# Patient Record
Sex: FEMALE | Race: BLACK OR AFRICAN AMERICAN | NOT HISPANIC OR LATINO | URBAN - METROPOLITAN AREA
[De-identification: names, ages, dates, MRNs, and addresses within clinical notes are randomized per-mention and may not be internally consistent; named-entity substitution may affect disease eponyms.]

---
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
## 2023-06-12 ENCOUNTER — APPOINTMENT (OUTPATIENT)
Dept: RHEUMATOLOGY | Facility: CLINIC | Age: 23
End: 2023-06-12
Payer: MEDICAID

## 2023-06-12 DIAGNOSIS — I73.00 RAYNAUD'S SYNDROME W/OUT GANGRENE: ICD-10-CM

## 2023-06-12 PROCEDURE — 99204 OFFICE O/P NEW MOD 45 MIN: CPT

## 2023-06-12 RX ORDER — NIFEDIPINE 30 MG/1
30 TABLET, EXTENDED RELEASE ORAL DAILY
Qty: 30 | Refills: 2 | Status: ACTIVE | COMMUNITY
Start: 2023-06-12 | End: 1900-01-01

## 2023-06-15 NOTE — REVIEW OF SYSTEMS
[Feeling Tired] : feeling tired [SOB on Exertion] : shortness of breath during exertion [As Noted in HPI] : as noted in HPI [Arthralgias] : arthralgias [Joint Pain] : joint pain [Joint Stiffness] : joint stiffness [Negative] : Heme/Lymph [Fever] : no fever [Chills] : no chills [Feeling Poorly] : not feeling poorly [Recent Weight Gain (___ Lbs)] : no recent weight gain [Recent Weight Loss (___ Lbs)] : no recent weight loss [Joint Swelling] : no joint swelling [Limb Pain] : no limb pain [Limb Swelling] : no limb swelling

## 2023-06-15 NOTE — HISTORY OF PRESENT ILLNESS
[Currently Experiencing] : currently [Fatigue] : fatigue [Dysphagia] : dysphagia [Shortness of Breath] : shortness of breath [Arthralgias] : arthralgias [Decreased ROM] : decreased range of motion [Morning Stiffness] : morning stiffness [Dyspnea] : dyspnea [FreeTextEntry1] : 22 year old female here to establish care for MCTD\par \par She was previously seen by peds rheum at [facility]. Has been loss to follow up since 2018.\par Has not been doing well lately. Feels that her joints get stiff and stuck, particularly in the legs (ankles, knees). Does not notice any swelling in the joints. No weakness in the muscles. Occasional dyspnea with exertion. Has not had any h/o pericardial effusion or pleural effusion. Recently saw a cardiologist for possible heart murmur - had TTE and EKG appr. 1 - 2 months ago. GERD + sometimes feels that the food gets stuck in the chest. \par \par Raynaud's + finger tips turn blue and get numb. No h/o digital ulcers. No blood clots. No h/o pregnancy  \par \par \par Prior HPI per Dr. 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 Started Prednisone \par ---10/2012 Seen by cardiology for chest pain. Echo WNL. No pulmonary HTN. Chest CT WNL.\par ---7/1/2013 off prednisone\par ---Moderate restrictive lung function on PFTs- DLCO 50% but stable. Last Pulmonology visit was on 10/1/14, recommended yearly follow up, sooner PRN\par ---8/2014 Normal swallow study\par ---1/2016-7/2016: Lost to follow up, continued methotrexate until 5/2016, when refill was denied pending follow up, discontinued HCQ and vitamin D after running out of supply \par ---Not here for past 2 years.. Now c/o of hip pain, occasional chest pain- middle; hot days; worse with inspiration. Feels SOB on hot days. Chest pain resolves when she uses her inhaler. Dysphagia- with steak. Has hip pain on right- started a few days ago. No back pain. Not taking any medications for pain. No joint swelling or stiffness. Had a few days of neck pain from bad sleeping posture; since resolved. No muscle weakness. No myalgias. Has not taken any medications for 2 years (?insurance issues). Reports Raynaud's symptoms. Denies fevers, hair loss, oral ulcers, rash, abdominal pain, N/V/D, hematuria.\par \par  [Anorexia] : no anorexia [Weight Loss] : no weight loss [Malaise] : no malaise [Fever] : no fever [Chills] : no chills [Depression] : no depression [Malar Facial Rash] : no malar facial rash [Skin Lesions] : no lesions [Skin Nodules] : no skin nodules [Oral Ulcers] : no oral ulcers [Cough] : no cough [Dry Mouth] : no dry mouth [Dysphonia] : no dysphonia [Chest Pain] : no chest pain [Joint Swelling] : no joint swelling [Joint Warmth] : no joint warmth [Joint Deformity] : no joint deformity [Falls] : no falls [Difficulty Standing] : no difficulty standing [Difficulty Walking] : no difficulty walking [Myalgias] : no myalgias [Muscle Weakness] : no muscle weakness [Muscle Spasms] : no muscle spasms [Muscle Cramping] : no muscle cramping [Visual Changes] : no visual changes [Eye Pain] : no eye pain [Eye Redness] : no eye redness [Dry Eyes] : no dry eyes

## 2023-06-15 NOTE — PHYSICAL EXAM
[General Appearance - Alert] : alert [General Appearance - In No Acute Distress] : in no acute distress [Sclera] : the sclera and conjunctiva were normal [PERRL With Normal Accommodation] : pupils were equal in size, round, and reactive to light [Extraocular Movements] : extraocular movements were intact [Neck Appearance] : the appearance of the neck was normal [Neck Cervical Mass (___cm)] : no neck mass was observed [Jugular Venous Distention Increased] : there was no jugular-venous distention [Thyroid Diffuse Enlargement] : the thyroid was not enlarged [Thyroid Nodule] : there were no palpable thyroid nodules [Auscultation Breath Sounds / Voice Sounds] : lungs were clear to auscultation bilaterally [Heart Rate And Rhythm] : heart rate was normal and rhythm regular [Heart Sounds] : normal S1 and S2 [Heart Sounds Gallop] : no gallops [Murmurs] : no murmurs [Heart Sounds Pericardial Friction Rub] : no pericardial rub [Full Pulse] : the pedal pulses are present [Edema] : there was no peripheral edema [Cervical Lymph Nodes Enlarged Posterior Bilaterally] : posterior cervical [Cervical Lymph Nodes Enlarged Anterior Bilaterally] : anterior cervical [Supraclavicular Lymph Nodes Enlarged Bilaterally] : supraclavicular [Axillary Lymph Nodes Enlarged Bilaterally] : axillary [No CVA Tenderness] : no ~M costovertebral angle tenderness [No Spinal Tenderness] : no spinal tenderness [Skin Color & Pigmentation] : normal skin color and pigmentation [Skin Turgor] : normal skin turgor [] : no rash [No Focal Deficits] : no focal deficits [Oriented To Time, Place, And Person] : oriented to person, place, and time [Impaired Insight] : insight and judgment were intact [Affect] : the affect was normal [FreeTextEntry1] : mild sclerodactyly of the hands + No synovitis

## 2023-06-15 NOTE — DATA REVIEWED
[FreeTextEntry1] : Prior records reviewed in [facility]  no discrete location documentation necessary

## 2023-06-15 NOTE — HISTORY OF PRESENT ILLNESS
[Currently Experiencing] : currently [Fatigue] : fatigue [Dysphagia] : dysphagia [Shortness of Breath] : shortness of breath [Arthralgias] : arthralgias [Decreased ROM] : decreased range of motion [Morning Stiffness] : morning stiffness [Dyspnea] : dyspnea [FreeTextEntry1] : 22 year old female here to establish care for MCTD\par \par She was previously seen by peds rheum at [facility]. Has been loss to follow up since 2018.\par Has not been doing well lately. Feels that her joints get stiff and stuck, particularly in the legs (ankles, knees). Does not notice any swelling in the joints. No weakness in the muscles. Occasional dyspnea with exertion. Has not had any h/o pericardial effusion or pleural effusion. Recently saw a cardiologist for possible heart murmur - had TTE and EKG appr. 1 - 2 months ago. GERD + sometimes feels that the food gets stuck in the chest. \par \par Raynaud's + finger tips turn blue and get numb. No h/o digital ulcers. No blood clots. No h/o pregnancy  \par \par \par Prior HPI per Dr. 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 Started Prednisone \par ---10/2012 Seen by cardiology for chest pain. Echo WNL. No pulmonary HTN. Chest CT WNL.\par ---7/1/2013 off prednisone\par ---Moderate restrictive lung function on PFTs- DLCO 50% but stable. Last Pulmonology visit was on 10/1/14, recommended yearly follow up, sooner PRN\par ---8/2014 Normal swallow study\par ---1/2016-7/2016: Lost to follow up, continued methotrexate until 5/2016, when refill was denied pending follow up, discontinued HCQ and vitamin D after running out of supply \par ---Not here for past 2 years.. Now c/o of hip pain, occasional chest pain- middle; hot days; worse with inspiration. Feels SOB on hot days. Chest pain resolves when she uses her inhaler. Dysphagia- with steak. Has hip pain on right- started a few days ago. No back pain. Not taking any medications for pain. No joint swelling or stiffness. Had a few days of neck pain from bad sleeping posture; since resolved. No muscle weakness. No myalgias. Has not taken any medications for 2 years (?insurance issues). Reports Raynaud's symptoms. Denies fevers, hair loss, oral ulcers, rash, abdominal pain, N/V/D, hematuria.\par \par  [Anorexia] : no anorexia [Weight Loss] : no weight loss [Malaise] : no malaise [Fever] : no fever [Chills] : no chills [Depression] : no depression [Malar Facial Rash] : no malar facial rash [Skin Lesions] : no lesions [Skin Nodules] : no skin nodules [Oral Ulcers] : no oral ulcers [Cough] : no cough [Dry Mouth] : no dry mouth [Dysphonia] : no dysphonia [Chest Pain] : no chest pain [Joint Swelling] : no joint swelling [Joint Warmth] : no joint warmth [Joint Deformity] : no joint deformity [Falls] : no falls [Difficulty Standing] : no difficulty standing [Difficulty Walking] : no difficulty walking [Myalgias] : no myalgias [Muscle Weakness] : no muscle weakness [Muscle Spasms] : no muscle spasms [Muscle Cramping] : no muscle cramping [Visual Changes] : no visual changes [Eye Pain] : no eye pain [Eye Redness] : no eye redness [Dry Eyes] : no dry eyes

## 2023-06-15 NOTE — ASSESSMENT
[FreeTextEntry1] : 22 year old female with MCTD here to establish care \par \par 1. MCTD with systemic sclerosis, myositis and SLE features - early sclerodactyly, myositis, GERD, arthralgias\par i) Skin: stable skin tightening and early sclerodactyly. Monitor for now. Encouraged to do PT/OT at home consistently. Denies active Raynaud's symptoms and did not have any color changes today during my exam. Will monitor for now. \par ii) MSK: elevated CPK in the past. Repeat labs today.\par iii) Pulmonary:Repeat PFTs and CT chest due. \par iv) Cardiac: ECHO was performed in May 2023 and was relatively normal. Repeat in 1 year . \par v) GI:  has had some difficulty swallowing and with GERD. Stable at this time. Monitor. \par vi) Renal: No h/o renal involvement \par vii) Heme: labs today\par viii) Neuro: no active issues\par \par Follow up in 6 weeks to discuss therapy after reviewing labs

## 2023-06-30 LAB
ALBUMIN SERPL ELPH-MCNC: 4.8 G/DL
ALP BLD-CCNC: 61 U/L
ALT SERPL-CCNC: 20 U/L
ANA PAT FLD IF-IMP: ABNORMAL
ANA SER IF-ACNC: ABNORMAL
ANION GAP SERPL CALC-SCNC: 13 MMOL/L
APPEARANCE: CLEAR
AST SERPL-CCNC: 33 U/L
BILIRUB SERPL-MCNC: 0.4 MG/DL
BILIRUBIN URINE: NEGATIVE
BLOOD URINE: NEGATIVE
BUN SERPL-MCNC: 7 MG/DL
C3 SERPL-MCNC: 110 MG/DL
C4 SERPL-MCNC: 22 MG/DL
CALCIUM SERPL-MCNC: 9.9 MG/DL
CCP AB SER IA-ACNC: <8 UNITS
CENP-A: <11 SI
CENP-B: <11 SI
CHLORIDE SERPL-SCNC: 102 MMOL/L
CK SERPL-CCNC: 465 U/L
CO2 SERPL-SCNC: 26 MMOL/L
COLOR: YELLOW
CREAT SERPL-MCNC: 0.55 MG/DL
CREAT SPEC-SCNC: 196 MG/DL
CREAT/PROT UR: 0.1 RATIO
CRP SERPL-MCNC: <3 MG/L
DEPRECATED KAPPA LC FREE/LAMBDA SER: 1.78 RATIO
DSDNA AB SER-ACNC: <12 IU/ML
EGFR: 134 ML/MIN/1.73M2
ENA RNP AB SER IA-ACNC: 3.2 AL
ENA SCL70 IGG SER IA-ACNC: <0.2 AL
ENA SM AB SER IA-ACNC: <0.2 AL
ENA SS-A AB SER IA-ACNC: 0.7 AL
ENA SS-B AB SER IA-ACNC: <0.2 AL
ERYTHROCYTE [SEDIMENTATION RATE] IN BLOOD BY WESTERGREN METHOD: 45 MM/HR
FIBRILLARIN: <11 SI
GLUCOSE QUALITATIVE U: NEGATIVE MG/DL
GLUCOSE SERPL-MCNC: 81 MG/DL
IGA SER QL IEP: 253 MG/DL
IGG SER QL IEP: 2030 MG/DL
IGM SER QL IEP: 202 MG/DL
KAPPA LC CSF-MCNC: 1.57 MG/DL
KAPPA LC SERPL-MCNC: 2.8 MG/DL
KETONES URINE: NEGATIVE MG/DL
LEUKOCYTE ESTERASE URINE: NEGATIVE
NITRITE URINE: NEGATIVE
PH URINE: 7.5
PM/SCL-100: <11 SI
PM/SCL-75: <11 SI
POTASSIUM SERPL-SCNC: 4 MMOL/L
PROT SERPL-MCNC: 8.2 G/DL
PROT UR-MCNC: 14 MG/DL
PROTEIN URINE: NORMAL MG/DL
RF+CCP IGG SER-IMP: NEGATIVE
RHEUMATOID FACT SER QL: 14 IU/ML
RP11: <11 SI
RP155: <11 SI
SCL-70: <11 SI
SODIUM SERPL-SCNC: 140 MMOL/L
SPECIFIC GRAVITY URINE: 1.02
TH/TO: <11 SI
THYROGLOB AB SERPL-ACNC: <20 IU/ML
THYROPEROXIDASE AB SERPL IA-ACNC: 42.7 IU/ML
U1-SNRNP RNP A: 11 SI
U1-SNRNP RNP C: <11 SI
U1-SNRNP RNP-70KD: <11 SI
UROBILINOGEN URINE: 1 MG/DL

## 2023-07-10 ENCOUNTER — NON-APPOINTMENT (OUTPATIENT)
Age: 23
End: 2023-07-10

## 2023-07-10 LAB
EJ AB SER QL: NEGATIVE
ENA JO1 AB SER IA-ACNC: <20 UNITS
ENA PM/SCL AB SER-ACNC: <20 UNITS
ENA SM+RNP AB SER IA-ACNC: 119 UNITS
ENA SS-A IGG SER QL: <20 UNITS
FIBRILLARIN AB SER QL: NEGATIVE
KU AB SER QL: ABNORMAL
MDA-5 (P140)(CADM-140): <20 UNITS
MI2 AB SER QL: NEGATIVE
NXP-2 (P140): <20 UNITS
OJ AB SER QL: NEGATIVE
PL12 AB SER QL: NEGATIVE
PL7 AB SER QL: NEGATIVE
SRP AB SERPL QL: NEGATIVE
TIF GAMMA (P155/140): <20 UNITS
U2 SNRNP AB SER QL: ABNORMAL

## 2023-07-25 ENCOUNTER — APPOINTMENT (OUTPATIENT)
Dept: RHEUMATOLOGY | Facility: CLINIC | Age: 23
End: 2023-07-25
Payer: MEDICAID

## 2023-07-25 VITALS
SYSTOLIC BLOOD PRESSURE: 109 MMHG | HEIGHT: 64 IN | HEART RATE: 79 BPM | OXYGEN SATURATION: 97 % | BODY MASS INDEX: 14 KG/M2 | RESPIRATION RATE: 16 BRPM | WEIGHT: 82 LBS | TEMPERATURE: 98 F | DIASTOLIC BLOOD PRESSURE: 77 MMHG

## 2023-07-25 DIAGNOSIS — M35.1 OTHER OVERLAP SYNDROMES: ICD-10-CM

## 2023-07-25 PROCEDURE — 99214 OFFICE O/P EST MOD 30 MIN: CPT

## 2023-07-25 RX ORDER — HYDROXYCHLOROQUINE SULFATE 200 MG/1
200 TABLET, FILM COATED ORAL
Qty: 30 | Refills: 2 | Status: ACTIVE | COMMUNITY
Start: 2023-07-25 | End: 1900-01-01

## 2023-07-25 NOTE — ASSESSMENT
[FreeTextEntry1] : 22 year old female with MCTD here to establish care \par \par 1. MCTD with systemic sclerosis, myositis and SLE features - early sclerodactyly, myositis, GERD, arthralgias\par i) Skin: stable skin tightening and early sclerodactyly. Monitor for now. Encouraged to do PT/OT at home consistently. Denies active Raynaud's symptoms and did not have any color changes today during my exam. Will monitor for now. \par ii) MSK: elevated CPK and persistent arthralgias. Start Plaquenil 200 mg daily. Referred for eye exam. Consider appropriate DMARD once her pulm work up is completed. \par iii) Pulmonary:Recent PFTs abnormal per patient and is scheduled for CT chest later this week. Advised to have the results faxed to me to determine if additional DMARD therapy is needed at this time.  \par iv) Cardiac: ECHO was performed in May 2023 and was relatively normal. Repeat in 1 year . \par v) GI:  has had some difficulty swallowing and with GERD. Stable at this time. Monitor. \par vi) Renal: No h/o renal involvement \par vii) Heme: no active issues \par viii) Neuro: no active issues\par \par Follow up in 6 weeks

## 2023-07-25 NOTE — HISTORY OF PRESENT ILLNESS
[___ Week(s) Ago] : [unfilled] week(s) ago [Currently Experiencing] : currently [Fatigue] : fatigue [Dysphagia] : dysphagia [Shortness of Breath] : shortness of breath [Arthralgias] : arthralgias [Decreased ROM] : decreased range of motion [Morning Stiffness] : morning stiffness [Dyspnea] : dyspnea [FreeTextEntry1] : Saw a pulmonologist in [place] - was told PFTs were abnormal and CT chest scheduled on 7/27. TTE was done 5/2023 [Anorexia] : no anorexia [Weight Loss] : no weight loss [Malaise] : no malaise [Fever] : no fever [Chills] : no chills [Depression] : no depression [Malar Facial Rash] : no malar facial rash [Skin Lesions] : no lesions [Skin Nodules] : no skin nodules [Oral Ulcers] : no oral ulcers [Cough] : no cough [Dry Mouth] : no dry mouth [Dysphonia] : no dysphonia [Chest Pain] : no chest pain [Joint Swelling] : no joint swelling [Joint Warmth] : no joint warmth [Joint Deformity] : no joint deformity [Falls] : no falls [Difficulty Standing] : no difficulty standing [Difficulty Walking] : no difficulty walking [Myalgias] : no myalgias [Muscle Weakness] : no muscle weakness [Muscle Spasms] : no muscle spasms [Muscle Cramping] : no muscle cramping [Visual Changes] : no visual changes [Eye Pain] : no eye pain [Eye Redness] : no eye redness [Dry Eyes] : no dry eyes

## 2023-10-11 ENCOUNTER — APPOINTMENT (OUTPATIENT)
Dept: RHEUMATOLOGY | Facility: CLINIC | Age: 23
End: 2023-10-11

## 2024-10-09 ENCOUNTER — NON-APPOINTMENT (OUTPATIENT)
Age: 24
End: 2024-10-09